# Patient Record
Sex: FEMALE | Race: WHITE
[De-identification: names, ages, dates, MRNs, and addresses within clinical notes are randomized per-mention and may not be internally consistent; named-entity substitution may affect disease eponyms.]

---

## 2019-12-21 ENCOUNTER — HOSPITAL ENCOUNTER (EMERGENCY)
Dept: HOSPITAL 56 - MW.ED | Age: 29
Discharge: HOME | End: 2019-12-21
Payer: SELF-PAY

## 2019-12-21 DIAGNOSIS — Z79.899: ICD-10-CM

## 2019-12-21 DIAGNOSIS — Z87.891: ICD-10-CM

## 2019-12-21 DIAGNOSIS — R11.10: Primary | ICD-10-CM

## 2019-12-21 DIAGNOSIS — Z91.09: ICD-10-CM

## 2019-12-21 NOTE — EDM.PDOC
ED HPI GENERAL MEDICAL PROBLEM





- General


Chief Complaint: General


Stated Complaint: VOMITTING


Time Seen by Provider: 12/21/19 17:05


Source of Information: Reports: Patient


History Limitations: Reports: No Limitations





- History of Present Illness


INITIAL COMMENTS - FREE TEXT/NARRATIVE: 





HISTORY AND PHYSICAL:





History of present illness:


Patient is a 29-year-old female presents to the ED with concern of recent hep A 

and hep B diagnosis. She states she goes to a suboxone clinic in [facility], had labs 

done and was told she was positive for hepatitis A and B. She was told she 

needs to establish with a primary doctor. She states she does not have one so 

she came to the ED. She reports over the past month she is having fullness when 

she eats and today she had an episode of vomiting. She denies fevers, chills, 

abdominal pain, headache, confusion, dysuria. 





Review of systems: 


As per history of present illness and below otherwise all systems reviewed and 

negative.





Past medical history: 


As per history of present illness and as reviewed below otherwise 

noncontributory.





Surgical history: 


As per history of present illness and as reviewed below otherwise 

noncontributory.





Social history: 


No reported history of drug or alcohol abuse.





Family history: 


As per history of present illness and as reviewed below otherwise 

noncontributory.





Physical exam:


General: Patient sitting comfortably in no acute distress and nontoxic appearing


HEENT: Atraumatic, normocephalic, pupils reactive, negative for conjunctival 

pallor or scleral icterus, mucous membranes moist, throat clear, neck supple, 

nontender, trachea midline. No meningeal signs. 


Lungs: Clear to auscultation, breath sounds equal bilaterally, chest nontender.


Heart: S1S2, regular, negative for clicks, rubs, or overt murmur.


Abdomen: Soft, nondistended, nontender. Negative for masses or 

hepatosplenomegaly. Negative for costovertebral tenderness. No rigidity, rebound

, guarding.


Pelvis: Stable nontender.


Genitourinary: Deferred.


Rectal: Deferred.


Extremities: Atraumatic, negative for cords or calf pain. Neurovascular 

unremarkable.


Neuro: Awake, alert, oriented. Cranial nerves II through XII unremarkable. 

Cerebellum unremarkable. Motor and sensory unremarkable throughout. Exam 

nonfocal.





Notes: 





Diagnostics:


none 





Therapeutics:


none 





Prescriptions:


none 





Impression: 


Medical screening exam 





Definitive disposition and diagnosis as appropriate pending reevaluation and 

review of above.





  ** Lower Abdomen


Pain Score (Numeric/FACES): 3





- Related Data


 Allergies











Allergy/AdvReac Type Severity Reaction Status Date / Time


 


iodine Allergy  Swelling Verified 12/21/19 16:46











Home Meds: 


 Home Meds





Buprenorphine HCl/Naloxone HCl [Suboxone 4 mg-1 mg Sl Film] 16 mg PO DAILY 12/21 /19 [History]











Past Medical History


Neurological History: Reports: Other (See Below)


Other Neuro History: epilepsy


Psychiatric History: Reports: Other (See Below)


Other Psychiatric History: opiod addiction





- Infectious Disease History


Infectious Disease History: Reports: Chicken Pox, Hepatitis A, Hepatitis B





- Past Surgical History


HEENT Surgical History: Reports: Naso-Sinus Surgery


Female  Surgical History: Reports: D&C





Social & Family History





- Family History


Family Medical History: Noncontributory





- Tobacco Use


Smoking Status *Q: Former Smoker


Used Tobacco, but Quit: Yes


Month/Year Tobacco Last Used: 2019





- Caffeine Use


Caffeine Use: Reports: Coffee





- Recreational Drug Use


Recreational Drug Use: Yes


Recreational Drug Type: Reports: Marijuana/Hashish


Recreational Drug Use Frequency: Weekly





ED ROS GENERAL





- Review of Systems


Review Of Systems: Comprehensive ROS is negative, except as noted in HPI.





ED EXAM, GENERAL





- Physical Exam


Exam: See Below (see dictation)





Course





- Vital Signs


Last Recorded V/S: 


 Last Vital Signs











Temp  98.1 F   12/21/19 16:46


 


Pulse  78   12/21/19 16:46


 


Resp  20   12/21/19 16:46


 


BP  115/63   12/21/19 16:46


 


Pulse Ox  97   12/21/19 16:46














Departure





- Departure


Time of Disposition: 17:05


Disposition: Home, Self-Care 01


Condition: Good


Clinical Impression: 


 Encounter for medical screening examination, Vomiting








- Discharge Information


Instructions:  Medical Screening Exam


Referrals: 


PCP,None [Primary Care Provider] - 


Forms:  ED Department Discharge


Additional Instructions: 


The following information is given to patients seen in the emergency department 

who are being discharged to home. This information is to outline your options 

for follow-up care. We provide all patients seen in our emergency department 

with a follow-up referral.





The need for follow-up, as well as the timing and circumstances, are variable 

depending upon the specifics of your emergency department visit.





If you don't have a primary care physician on staff, we will provide you with a 

referral. We always advise you to contact your personal physician following an 

emergency department visit to inform them of the circumstance of the visit and 

for follow-up with them and/or the need for any referrals to a consulting 

specialist.





The emergency department will also refer you to a specialist when appropriate. 

This referral assures that you have the opportunity for follow-up care with a 

specialist. All of these measure are taken in an effort to provide you with 

optimal care, which includes your follow-up.





Under all circumstances we always encourage you to contact your private 

physician who remains a resource for coordinating your care. When calling for 

follow-up care, please make the office aware that this follow-up is from your 

recent emergency room visit. If for any reason you are refused follow-up, 

please contact the [facility] Emergency 

Department at [phone] and asked to speak to the emergency department 

charge nurse.





[facility]


Primary Care


[address] [place] [id number]


Phone: [phone]


Fax: [phone]





[facility]


[address] [place] [id number]


Phone: [phone]


Fax: [phone]





Drink plenty of small sips of fluid throughout the day and bland food as 

tolerated


Follow up with primary care provider


Return to ED as needed as discussed 














Sepsis Event Note





- Evaluation


Sepsis Screening Result: No Definite Risk





- Focused Exam


Vital Signs: 


 Vital Signs











  Temp Pulse Resp BP Pulse Ox


 


 12/21/19 16:46  98.1 F  78  20  115/63  97











Date Exam was Performed: 12/21/19


Time Exam was Performed: 17:10